# Patient Record
Sex: FEMALE | Employment: FULL TIME | URBAN - METROPOLITAN AREA
[De-identification: names, ages, dates, MRNs, and addresses within clinical notes are randomized per-mention and may not be internally consistent; named-entity substitution may affect disease eponyms.]

---

## 2017-12-20 ENCOUNTER — HOSPITAL ENCOUNTER (OUTPATIENT)
Dept: OTHER | Age: 52
Discharge: HOME | End: 2017-12-20

## 2018-03-14 ENCOUNTER — HOSPITAL ENCOUNTER (OUTPATIENT)
Dept: OTHER | Age: 53
Discharge: HOME | End: 2018-03-15

## 2018-03-15 ENCOUNTER — HOSPITAL ENCOUNTER (OUTPATIENT)
Dept: MAMMOGRAPHY | Age: 53
Discharge: HOME | End: 2018-03-16

## 2018-03-15 DIAGNOSIS — Z12.31 VISIT FOR SCREENING MAMMOGRAM: ICD-10-CM

## 2021-09-20 ENCOUNTER — OFFICE VISIT (OUTPATIENT)
Dept: CARDIAC SURGERY | Facility: CLINIC | Age: 56
End: 2021-09-20

## 2021-09-20 VITALS
TEMPERATURE: 97.7 F | SYSTOLIC BLOOD PRESSURE: 138 MMHG | DIASTOLIC BLOOD PRESSURE: 78 MMHG | BODY MASS INDEX: 33.29 KG/M2 | HEIGHT: 64 IN | OXYGEN SATURATION: 98 % | HEART RATE: 107 BPM | WEIGHT: 195 LBS

## 2021-09-20 DIAGNOSIS — I65.22 CAROTID STENOSIS, ASYMPTOMATIC, LEFT: Primary | ICD-10-CM

## 2021-09-20 PROCEDURE — 99204 OFFICE O/P NEW MOD 45 MIN: CPT | Performed by: THORACIC SURGERY (CARDIOTHORACIC VASCULAR SURGERY)

## 2021-09-20 RX ORDER — GLIMEPIRIDE 2 MG/1
2 TABLET ORAL 2 TIMES DAILY
COMMUNITY

## 2021-09-20 RX ORDER — METOPROLOL TARTRATE 37.5 MG/1
25 TABLET, FILM COATED ORAL 2 TIMES DAILY
COMMUNITY

## 2021-09-20 RX ORDER — ASPIRIN 81 MG/1
81 TABLET ORAL DAILY
COMMUNITY

## 2021-09-20 NOTE — PROGRESS NOTES
09/20/2021  Patient Information  Darcie Armendariz                                                                                          [address] [facility] [address]   1965  'PCP/Referring Physician'  Rhea Azul PA  [phone]  No ref. provider found    Chief Complaint   Patient presents with   • Consult     Np referred for carotid stenosis,complains of a carotid bruit found by her PCP.   • Carotid Artery Disease       History of Present Illness:  The patient is a 55-year-old  female who is being referred for carotid stenosis. She is a patient of Ms. Rhea Sanders.  The patient had a bruit and then underwent a carotid duplex and was found to have 50 to 69% stenosis of the left internal carotid artery.  She is a smoker and says she has been smoking since age 17.  She also has dyslipidemia.  She denies any TIA or CVA symptoms.  Her father, apparently, had a massive stroke and was quite disabled afterwards.      Patient Active Problem List   Diagnosis   • Carotid stenosis, asymptomatic, left     Past Medical History:   Diagnosis Date   • Arthritis    • Diabetes mellitus (CMS/HCC)    • Hypertension      Past Surgical History:   Procedure Laterality Date   • CHOLECYSTECTOMY     • KNEE ARTHROSCOPY Right        Current Outpatient Medications:   •  aspirin 81 MG EC tablet, Take 81 mg by mouth Daily., Disp: , Rfl:   •  empagliflozin (Jardiance) 10 MG tablet tablet, Take 10 mg by mouth Daily., Disp: , Rfl:   •  glimepiride (AMARYL) 2 MG tablet, Take 2 mg by mouth 2 (two) times a day., Disp: , Rfl:   •  metFORMIN (GLUCOPHAGE) 1000 MG tablet, Take 1,000 mg by mouth 2 (Two) Times a Day With Meals., Disp: , Rfl:   •  Metoprolol Tartrate 37.5 MG tablet, Take 25 mg by mouth 2 (two) times a day., Disp: , Rfl:   •  SITagliptin (JANUVIA) 100 MG tablet, Take 100 mg by mouth Daily., Disp: , Rfl:   Allergies   Allergen Reactions   • Codeine Nausea And Vomiting   • Penicillins Hives     Social  "History     Socioeconomic History   • Marital status:      Spouse name: Not on file   • Number of children: 3   • Years of education: Not on file   • Highest education level: Not on file   Tobacco Use   • Smoking status: Current Every Day Smoker     Packs/day: 2.00     Years: 38.00     Pack years: 76.00     Types: Cigarettes   • Smokeless tobacco: Never Used   Substance and Sexual Activity   • Alcohol use: Not Currently   • Drug use: Not Currently     Family History   Problem Relation Age of Onset   • Arthritis Mother    • Stroke Father    • Ulcers Father      Review of Systems   Constitutional: Negative. Negative for chills, fever, malaise/fatigue, night sweats and weight loss.   HENT: Negative.  Negative for hearing loss, odynophagia and sore throat.    Eyes: Negative.    Cardiovascular: Positive for dyspnea on exertion. Negative for chest pain, leg swelling, orthopnea and palpitations.   Respiratory: Negative.  Negative for cough and hemoptysis.    Endocrine: Negative for cold intolerance, heat intolerance, polydipsia, polyphagia and polyuria.   Hematologic/Lymphatic: Negative.  Does not bruise/bleed easily.   Skin: Negative.  Negative for itching and rash.   Musculoskeletal: Positive for arthritis and joint pain. Negative for joint swelling and myalgias.   Gastrointestinal: Positive for diarrhea. Negative for abdominal pain, constipation, hematemesis, hematochezia, melena, nausea and vomiting.   Genitourinary: Negative.  Negative for dysuria, frequency and hematuria.   Neurological: Negative.  Negative for focal weakness, headaches, numbness and seizures.   Psychiatric/Behavioral: Negative.  Negative for suicidal ideas.   Allergic/Immunologic: Negative.    All other systems reviewed and are negative.    Vitals:    09/20/21 1341   BP: 138/78   BP Location: Right arm   Patient Position: Sitting   Pulse: 107   Temp: 97.7 °F (36.5 °C)   SpO2: 98%   Weight: 88.5 kg (195 lb)   Height: 162.6 cm (64\")    "   Physical Exam  Vitals and nursing note reviewed.   Constitutional:       General: She is not in acute distress.     Appearance: She is well-developed.   HENT:      Head: Normocephalic.   Eyes:      Conjunctiva/sclera: Conjunctivae normal.      Pupils: Pupils are equal, round, and reactive to light.   Neck:      Thyroid: No thyroid mass or thyromegaly.   Cardiovascular:      Rate and Rhythm: Normal rate.      Pulses:           Carotid pulses are on the left side with bruit.     Heart sounds: No murmur heard.   No friction rub. No gallop.    Pulmonary:      Breath sounds: No wheezing, rhonchi or rales.   Abdominal:      General: Bowel sounds are normal. There is no distension.      Palpations: Abdomen is soft. There is no mass.      Tenderness: There is no abdominal tenderness.   Musculoskeletal:         General: No deformity. Normal range of motion.      Cervical back: Normal range of motion.   Skin:     Findings: No petechiae.      Nails: There is no clubbing.   Neurological:      Mental Status: She is oriented to person, place, and time.      Cranial Nerves: No cranial nerve deficit.      Sensory: No sensory deficit.   Psychiatric:         Behavior: Behavior normal.         The ROS, past medical history, surgical history, family history, social history and vitals were reviewed by myself and corrected as needed.      Labs/Imaging:  I have obtained and reviewed the medical records from Ms. Sanders, including the carotid duplex demonstrating 50% to 69% stenosis of the left ICA.    Assessment/Plan:  The patient is a 55-year-old female who presents at this time with carotid artery stenosis.  She is a smoker and I spent a great deal of time talking to her about the importance of not smoking. I agree with the interpretation that she has evidence of 50 to 69% stenosis of her left ICA.  I recommend we proceed with a CTA of her carotid arteries to further assess this.  She is on aspirin.  We will see her back in  follow-up.  Thank you for this consultation.    Patient Active Problem List   Diagnosis   • Carotid stenosis, asymptomatic, left     CC: SUSANA Colin editing for João Siu MD         I, João Siu MD, have read and agree with the editing done by Madeleine Caruso, .

## 2021-09-21 DIAGNOSIS — I65.22 CAROTID STENOSIS, ASYMPTOMATIC, LEFT: Primary | ICD-10-CM

## 2021-09-23 ENCOUNTER — PATIENT ROUNDING (BHMG ONLY) (OUTPATIENT)
Dept: CARDIAC SURGERY | Facility: CLINIC | Age: 56
End: 2021-09-23

## 2021-09-23 NOTE — PROGRESS NOTES
September 23, 2021    Hello, may I speak with Darcie Armendariz?    My name is Ali      I am  with [facility]  [address]  [facility] [address] [phone].    Before we get started may I verify your date of birth? 1965    I am calling to officially welcome you to our practice and ask about your recent visit. Is this a good time to talk? Yes    Tell me about your visit with us. What things went well? Everyone was very nice and she enjoyed her visit.        We're always looking for ways to make our patients' experiences even better. Do you have recommendations on ways we may improve?  No    Overall were you satisfied with your first visit to our practice? Yes       I appreciate you taking the time to speak with me today. Is there anything else I can do for you? No      Thank you, and have a great day.

## 2021-09-24 ENCOUNTER — TELEPHONE (OUTPATIENT)
Dept: CARDIAC SURGERY | Facility: CLINIC | Age: 56
End: 2021-09-24

## 2021-09-24 NOTE — TELEPHONE ENCOUNTER
Called pt with CTA and FU appts. Pt cancelled all appts til she obtains insurance coverage. Pt will call back to schedule when she gets coverage.